# Patient Record
Sex: MALE | Race: OTHER | NOT HISPANIC OR LATINO | Employment: OTHER | URBAN - METROPOLITAN AREA
[De-identification: names, ages, dates, MRNs, and addresses within clinical notes are randomized per-mention and may not be internally consistent; named-entity substitution may affect disease eponyms.]

---

## 2018-01-25 NOTE — PROCEDURE NOTE: SURGICAL
<p>Prior to commencing surgery patient identification, surgical procedure, site, and side were confirmed by Dr. Adi Mcguire. Following topical proparacaine anesthesia, the patient was positioned at the YAG laser, a contact lens coupled to the cornea with methylcellulose and an axial posterior capsulotomy performed without complication using 3.2 Mj x 25. Excess methylcellulose was washed from the eye, one drop of Alphagan was instilled and the patient returned to the holding area having tolerated the procedure well and without complication. </p><p>MRN [id number]</p>

## 2018-01-26 NOTE — PATIENT DISCUSSION
On POD #1, the patient is doing well. Post-op cataract sheet was given and reviewed. Instructed to continue antibiotic drops for 7 days, steroid drops 2 times a day for 2 weeks, then 1 time a day for 2 weeks and NSAID drops for 4 weeks. No swimming for 2 weeks. Contact immediately for decreased vision, red eye or pain. Advised against heavy lifting, prolonged bending, or straining. No make up/showers/water to the eye. Emergency phone numbers given for patient to call at any time.

## 2018-05-12 NOTE — PATIENT DISCUSSION
Patient witnessed seizure, patient history of seizures Post op gtt instructions reviewed with patient.

## 2018-08-14 NOTE — PATIENT DISCUSSION
PHOTOGRAPHS: I have reviewed the external ocular photographs of this patient which show the following: significant dermatochalasis of both upper eyelids and significant brow ptosis bilaterally.

## 2018-09-07 NOTE — PATIENT DISCUSSION
Also, please do not hesitate to call us if you have any concerns not addressed by this information. Please call [phone] and we will do everything we can to help you during this period.

## 2018-09-25 NOTE — PATIENT DISCUSSION
Patient presents with a small incision opening medially on the right upper eyelid. The incision appears to be healing spontaneously. No evidence of infection. Recommend warm compresses. Follow up prn.

## 2019-06-25 PROBLEM — Z00.00 ENCOUNTER FOR PREVENTIVE HEALTH EXAMINATION: Status: ACTIVE | Noted: 2019-06-25

## 2020-06-04 ENCOUNTER — RETINA CONSULT (OUTPATIENT)
Dept: URBAN - METROPOLITAN AREA CLINIC 35 | Facility: CLINIC | Age: 65
End: 2020-06-04

## 2020-06-04 DIAGNOSIS — H25.13: ICD-10-CM

## 2020-06-04 DIAGNOSIS — H35.30: ICD-10-CM

## 2020-06-04 DIAGNOSIS — H33.302: ICD-10-CM

## 2020-06-04 PROCEDURE — 99204 OFFICE O/P NEW MOD 45 MIN: CPT

## 2020-06-04 PROCEDURE — 92250 FUNDUS PHOTOGRAPHY W/I&R: CPT

## 2020-06-04 ASSESSMENT — VISUAL ACUITY
OS_SC: 20/50-2
OD_PH: 20/60+2
OS_PH: 20/30-2
OD_SC: 20/400

## 2020-06-29 ENCOUNTER — ESTABLISHED COMPREHENSIVE EXAM (OUTPATIENT)
Dept: URBAN - METROPOLITAN AREA CLINIC 35 | Facility: CLINIC | Age: 65
End: 2020-06-29

## 2020-06-29 DIAGNOSIS — H25.813: ICD-10-CM

## 2020-06-29 PROCEDURE — 92310-3 LEVEL 3 CONTACT LENS MANAGEMENT

## 2020-06-29 ASSESSMENT — TONOMETRY
OD_IOP_MMHG: 12
OS_IOP_MMHG: 12

## 2020-06-29 ASSESSMENT — KERATOMETRY
OD_K1POWER_DIOPTERS: 42
OD_K2POWER_DIOPTERS: 44.75
OS_K1POWER_DIOPTERS: 42.25
OS_K2POWER_DIOPTERS: 44

## 2020-06-29 ASSESSMENT — VISUAL ACUITY
OS_CC: 20/200
OD_CC: 20/50-1
OS_CC: 20/50-1
OU_CC: 20/50+1
OU_CC: 20/40-1
OD_CC: 20/40-1

## 2020-07-24 ENCOUNTER — CONTACT LENS FOLLOW UP (OUTPATIENT)
Dept: URBAN - METROPOLITAN AREA CLINIC 35 | Facility: CLINIC | Age: 65
End: 2020-07-24

## 2020-07-24 DIAGNOSIS — H52.13: ICD-10-CM

## 2020-07-24 PROCEDURE — 92310F

## 2020-07-24 ASSESSMENT — KERATOMETRY
OS_K1POWER_DIOPTERS: 42.25
OD_K2POWER_DIOPTERS: 44.75
OD_K1POWER_DIOPTERS: 42
OS_K2POWER_DIOPTERS: 44

## 2020-07-24 ASSESSMENT — VISUAL ACUITY
OD_CC: 20/25
OD_CC: J2
OU_CC: J1
OS_CC: 20/25-2
OU_CC: 20/25
OS_CC: J3

## 2021-06-10 ENCOUNTER — ESTABLISHED COMPREHENSIVE EXAM (OUTPATIENT)
Dept: URBAN - METROPOLITAN AREA CLINIC 35 | Facility: CLINIC | Age: 66
End: 2021-06-10

## 2021-06-10 DIAGNOSIS — H33.302: ICD-10-CM

## 2021-06-10 DIAGNOSIS — H35.30: ICD-10-CM

## 2021-06-10 PROCEDURE — 99214 OFFICE O/P EST MOD 30 MIN: CPT

## 2021-06-10 PROCEDURE — 92250 FUNDUS PHOTOGRAPHY W/I&R: CPT

## 2021-06-10 ASSESSMENT — TONOMETRY
OD_IOP_MMHG: 12
OS_IOP_MMHG: 12

## 2021-06-10 ASSESSMENT — VISUAL ACUITY
OD_PH: 20/50-2
OD_SC: 20/200+1
OS_SC: 20/50
OS_PH: 20/40

## 2021-12-14 ENCOUNTER — COMPREHENSIVE EXAM (OUTPATIENT)
Dept: URBAN - METROPOLITAN AREA CLINIC 35 | Facility: CLINIC | Age: 66
End: 2021-12-14

## 2021-12-14 DIAGNOSIS — H33.302: ICD-10-CM

## 2021-12-14 DIAGNOSIS — H35.30: ICD-10-CM

## 2021-12-14 DIAGNOSIS — H52.13: ICD-10-CM

## 2021-12-14 DIAGNOSIS — H43.813: ICD-10-CM

## 2021-12-14 DIAGNOSIS — H04.123: ICD-10-CM

## 2021-12-14 PROCEDURE — 92015 DETERMINE REFRACTIVE STATE: CPT

## 2021-12-14 PROCEDURE — 92014 COMPRE OPH EXAM EST PT 1/>: CPT

## 2021-12-14 ASSESSMENT — VISUAL ACUITY
OS_CC: J1
OU_CC: J1
OD_CC: 20/30
OS_CC: 20/30
OD_CC: J1
OU_CC: 20/25

## 2021-12-14 ASSESSMENT — TONOMETRY
OD_IOP_MMHG: 17
OS_IOP_MMHG: 17

## 2022-07-14 ENCOUNTER — ESTABLISHED PATIENT (OUTPATIENT)
Dept: URBAN - METROPOLITAN AREA CLINIC 35 | Facility: CLINIC | Age: 67
End: 2022-07-14

## 2022-07-14 DIAGNOSIS — H33.302: ICD-10-CM

## 2022-07-14 DIAGNOSIS — H25.813: ICD-10-CM

## 2022-07-14 DIAGNOSIS — H43.813: ICD-10-CM

## 2022-07-14 DIAGNOSIS — H04.123: ICD-10-CM

## 2022-07-14 PROCEDURE — 92250 FUNDUS PHOTOGRAPHY W/I&R: CPT

## 2022-07-14 PROCEDURE — 99214 OFFICE O/P EST MOD 30 MIN: CPT

## 2022-07-14 ASSESSMENT — TONOMETRY
OS_IOP_MMHG: 11
OD_IOP_MMHG: 11

## 2022-11-21 NOTE — PATIENT DISCUSSION
VISUALLY SIGNIFICANT. OPTION OF SURGERY VERSUS FOLLOWING VERSUS UPDATING  GLASSES DISCUSSED. RBA'S DISCUSSED, PATIENT UNDERSTANDS AND DESIRES SURGERY TO INCREASE  VISION FOR READING STREET SIGNS, DRIVING AND DRIVING AT NIGHT DUE TO GLARE. SCHEDULE CATARACT SURGERY/PRE-OP OU.

## 2022-11-28 NOTE — PATIENT DISCUSSION
RBA'S DISCUSSED, PATIENT UNDERSTANDS AND DESIRES TO PROCEED WITH  SURGERY. CONSENT READ AND SIGNED. PATIENT DESIRES PANOPTIX MF TORIC IOL OS.

## 2022-11-28 NOTE — PATIENT DISCUSSION
PMH discussed iols with patient, he understands he may still need to wear gls for night driving/reading small print/ halos at night  - he understands and would like to proceed with MF IOL.

## 2022-12-15 ENCOUNTER — COMPREHENSIVE EXAM (OUTPATIENT)
Dept: URBAN - METROPOLITAN AREA CLINIC 35 | Facility: CLINIC | Age: 67
End: 2022-12-15

## 2022-12-15 PROCEDURE — 92015 DETERMINE REFRACTIVE STATE: CPT

## 2022-12-15 PROCEDURE — 92014 COMPRE OPH EXAM EST PT 1/>: CPT

## 2022-12-15 PROCEDURE — 92310-3 LEVEL 3 CONTACT LENS MANAGEMENT

## 2022-12-15 ASSESSMENT — VISUAL ACUITY
OU_CC: 20/20 CL
OS_CC: J1 CL
OS_CC: 20/20-1 CL
OD_CC: 20/30+2 CL
OU_CC: J1 CL
OD_CC: J1 CL

## 2022-12-15 ASSESSMENT — TONOMETRY
OD_IOP_MMHG: 13
OS_IOP_MMHG: 14

## 2023-01-20 ENCOUNTER — FOLLOW UP (OUTPATIENT)
Dept: URBAN - METROPOLITAN AREA CLINIC 35 | Facility: CLINIC | Age: 68
End: 2023-01-20

## 2023-01-20 DIAGNOSIS — H43.813: ICD-10-CM

## 2023-01-20 DIAGNOSIS — H35.30: ICD-10-CM

## 2023-01-20 DIAGNOSIS — H25.813: ICD-10-CM

## 2023-01-20 DIAGNOSIS — H04.123: ICD-10-CM

## 2023-01-20 DIAGNOSIS — H33.302: ICD-10-CM

## 2023-01-20 PROCEDURE — 99211T TECH SERVICE

## 2023-01-20 ASSESSMENT — VISUAL ACUITY
OD_CC: 20/25
OS_CC: 20/25
OU_CC: 20/25

## 2023-07-27 ENCOUNTER — COMPREHENSIVE EXAM (OUTPATIENT)
Dept: URBAN - METROPOLITAN AREA CLINIC 35 | Facility: CLINIC | Age: 68
End: 2023-07-27

## 2023-07-27 DIAGNOSIS — H25.813: ICD-10-CM

## 2023-07-27 DIAGNOSIS — H33.302: ICD-10-CM

## 2023-07-27 DIAGNOSIS — H35.30: ICD-10-CM

## 2023-07-27 DIAGNOSIS — H04.123: ICD-10-CM

## 2023-07-27 DIAGNOSIS — H43.813: ICD-10-CM

## 2023-07-27 PROCEDURE — 99214 OFFICE O/P EST MOD 30 MIN: CPT

## 2023-07-27 PROCEDURE — 92250 FUNDUS PHOTOGRAPHY W/I&R: CPT

## 2023-07-27 ASSESSMENT — TONOMETRY
OS_IOP_MMHG: 12
OD_IOP_MMHG: 11

## 2023-07-27 ASSESSMENT — VISUAL ACUITY
OD_CC: 20/25-1
OS_CC: 20/25-1

## 2024-08-01 NOTE — PATIENT DISCUSSION
DOING WELL. CONTINUE DROPS AS DIRECTED. SPECS RX OFFERED. RX ARC IN SPECS  TO MINIMIZE GLARE. RETURN FOR FOLLOW-UP AS SCHEDULED.
RBA'S DISCUSSED, PATIENT UNDERSTANDS AND DESIRES TO PROCEED WITH  SURGERY. CONSENT READ AND SIGNED. PATIENT DESIRES PANOPTIX MF TORIC IOL OD.
Respimat DOS.

## 2024-11-07 ENCOUNTER — COMPREHENSIVE EXAM (OUTPATIENT)
Dept: URBAN - METROPOLITAN AREA CLINIC 35 | Facility: CLINIC | Age: 69
End: 2024-11-07

## 2024-11-07 DIAGNOSIS — H33.302: ICD-10-CM

## 2024-11-07 DIAGNOSIS — H04.123: ICD-10-CM

## 2024-11-07 DIAGNOSIS — H25.813: ICD-10-CM

## 2024-11-07 DIAGNOSIS — H35.30: ICD-10-CM

## 2024-11-07 DIAGNOSIS — H43.813: ICD-10-CM

## 2024-11-07 PROCEDURE — 92134 CPTRZ OPH DX IMG PST SGM RTA: CPT | Mod: NC

## 2024-11-07 PROCEDURE — 99213 OFFICE O/P EST LOW 20 MIN: CPT

## 2024-11-07 PROCEDURE — 92250 FUNDUS PHOTOGRAPHY W/I&R: CPT

## 2024-12-24 ENCOUNTER — PREPPED CHART (OUTPATIENT)
Age: 69
End: 2024-12-24

## 2025-05-07 ENCOUNTER — COMPREHENSIVE EXAM (OUTPATIENT)
Age: 70
End: 2025-05-07

## 2025-05-07 DIAGNOSIS — H04.123: ICD-10-CM

## 2025-05-07 DIAGNOSIS — H52.7: ICD-10-CM

## 2025-05-07 DIAGNOSIS — H35.30: ICD-10-CM

## 2025-05-07 DIAGNOSIS — H33.302: ICD-10-CM

## 2025-05-07 DIAGNOSIS — Z46.0: ICD-10-CM

## 2025-05-07 DIAGNOSIS — H43.813: ICD-10-CM

## 2025-05-07 DIAGNOSIS — H25.813: ICD-10-CM

## 2025-05-07 PROCEDURE — 92014 COMPRE OPH EXAM EST PT 1/>: CPT

## 2025-05-07 PROCEDURE — 92015 DETERMINE REFRACTIVE STATE: CPT

## 2025-05-07 PROCEDURE — 92134 CPTRZ OPH DX IMG PST SGM RTA: CPT

## 2025-05-07 PROCEDURE — 92310-1 LEVEL 1 SOFT LENS UPDATE
